# Patient Record
Sex: MALE | URBAN - METROPOLITAN AREA
[De-identification: names, ages, dates, MRNs, and addresses within clinical notes are randomized per-mention and may not be internally consistent; named-entity substitution may affect disease eponyms.]

---

## 2022-03-29 ENCOUNTER — APPOINTMENT (RX ONLY)
Dept: URBAN - METROPOLITAN AREA CLINIC 80 | Facility: CLINIC | Age: 62
Setting detail: DERMATOLOGY
End: 2022-03-29

## 2022-03-29 DIAGNOSIS — L50.3 DERMATOGRAPHIC URTICARIA: ICD-10-CM

## 2022-03-29 DIAGNOSIS — L72.0 EPIDERMAL CYST: ICD-10-CM

## 2022-03-29 DIAGNOSIS — L259 CONTACT DERMATITIS AND OTHER ECZEMA, UNSPECIFIED CAUSE: ICD-10-CM

## 2022-03-29 DIAGNOSIS — L30.8 OTHER SPECIFIED DERMATITIS: ICD-10-CM

## 2022-03-29 PROBLEM — L23.9 ALLERGIC CONTACT DERMATITIS, UNSPECIFIED CAUSE: Status: ACTIVE | Noted: 2022-03-29

## 2022-03-29 PROCEDURE — ? MEDICATION COUNSELING

## 2022-03-29 PROCEDURE — ? FULL BODY SKIN EXAM - DECLINED

## 2022-03-29 PROCEDURE — ? COUNSELING

## 2022-03-29 PROCEDURE — ? PRESCRIPTION

## 2022-03-29 PROCEDURE — ? ADDITIONAL NOTES

## 2022-03-29 PROCEDURE — 99203 OFFICE O/P NEW LOW 30 MIN: CPT

## 2022-03-29 RX ORDER — CETIRIZINE HCL 1 MG/ML
SOLUTION, ORAL ORAL QD
Qty: 60 | Refills: 3 | Status: ERX | COMMUNITY
Start: 2022-03-29

## 2022-03-29 RX ORDER — HYDROCORTISONE 25 MG/G
CREAM TOPICAL
Qty: 30 | Refills: 3 | Status: ERX | COMMUNITY
Start: 2022-03-29

## 2022-03-29 RX ORDER — FAMOTIDINE 20 MG/1
TABLET, FILM COATED ORAL
Qty: 60 | Refills: 3 | Status: ERX | COMMUNITY
Start: 2022-03-29

## 2022-03-29 RX ADMIN — HYDROCORTISONE: 25 CREAM TOPICAL at 00:00

## 2022-03-29 RX ADMIN — Medication: at 00:00

## 2022-03-29 RX ADMIN — FAMOTIDINE: 20 TABLET, FILM COATED ORAL at 00:00

## 2022-03-29 ASSESSMENT — LOCATION ZONE DERM
LOCATION ZONE: FACE
LOCATION ZONE: EYELID

## 2022-03-29 ASSESSMENT — LOCATION DETAILED DESCRIPTION DERM
LOCATION DETAILED: RIGHT SUPERIOR MEDIAL MALAR CHEEK
LOCATION DETAILED: RIGHT MEDIAL INFERIOR EYELID

## 2022-03-29 ASSESSMENT — LOCATION SIMPLE DESCRIPTION DERM
LOCATION SIMPLE: RIGHT INFERIOR EYELID
LOCATION SIMPLE: RIGHT CHEEK

## 2022-03-29 NOTE — PROCEDURE: MIPS QUALITY
Quality 47: Advance Care Plan: Advance Care Planning discussed and documented; advance care plan or surrogate decision maker documented in the medical record.
Quality 226: Preventive Care And Screening: Tobacco Use: Screening And Cessation Intervention: Patient screened for tobacco use and is an ex/non-smoker
Detail Level: Simple
Quality 130: Documentation Of Current Medications In The Medical Record: Current Medications Documented
Quality 431: Preventive Care And Screening: Unhealthy Alcohol Use - Screening: Patient not identified as an unhealthy alcohol user when screened for unhealthy alcohol use using a systematic screening method

## 2022-03-29 NOTE — PROCEDURE: MEDICATION COUNSELING
PRIMARY CARE PROVIDER: Carl Vu MD  REFERRING PROVIDER: No ref. provider found    Chief Complaint   Patient presents with   • Follow-up       Subjective   History of Present Illness:  George Wilder is a  63 y.o. male  who presents for follow up s/p septoplasty, resection of left sammy bullosa, and bilateral maxillary antrostomy and uncinectomy by Dr. Garibay on 8/10/2020. The patient has had a relatively normal postoperative course and currently has no related complaints.      Review of Systems:  Review of Systems   Constitutional: Negative for chills and fever.   HENT: Negative for congestion, ear discharge, ear pain, rhinorrhea, sinus pressure, sinus pain and sore throat.    Respiratory: Negative for cough and shortness of breath.    Cardiovascular: Negative for chest pain.   Gastrointestinal: Negative for diarrhea, nausea and vomiting.   Musculoskeletal: Positive for arthralgias.   Allergic/Immunologic: Positive for environmental allergies.       Past History:  Past Medical History:   Diagnosis Date   • Allergic rhinitis due to allergen 8/8/2017   • Arthritis    • Back pain    • CHF (congestive heart failure) (CMS/HCC)    • Chronic ethmoidal sinusitis 5/18/2017   • Chronic maxillary sinusitis 5/18/2017   • Coronary artery disease 1984    stent LAD, Cx, RCA    • GERD (gastroesophageal reflux disease)    • Heart attack (CMS/HCC) 1994    Dr Stiles    • Heartburn    • Hyperlipidemia    • Hypertension    • Myocardial infarction (CMS/HCC)    • Pain     back, & Neck      Past Surgical History:   Procedure Laterality Date   • ANKLE SURGERY     • BACK SURGERY     • CARDIAC CATHETERIZATION  1984, 1994   • CHOLECYSTECTOMY  2014    Lararocopic    • ENDOSCOPIC FUNCTIONAL SINUS SURGERY (FESS) N/A 6/12/2017    Procedure: ENDOSCOPIC FUNCTIONAL SINUS SURGERY WITH LEFT MIDDLE MEATAL ANTROSTOMY AND LEFT ETHMOIDECTOMY;  Surgeon: Jona Garibay MD;  Location: [facility] OR;  Service:    • ENDOSCOPIC FUNCTIONAL SINUS  SURGERY (FESS) Bilateral 8/10/2020    Procedure: Septoplasty Resection of left sammy bullosa deformity Bilateral maxillary antrostomy and uncinectomy;  Surgeon: Jona Garibay MD;  Location: [facility] OR;  Service: ENT;  Laterality: Bilateral;   • ETHMOIDECTOMY Left 2017    Procedure: ETHMOIDECTOMY ENDOSCOPIC;  Surgeon: Jona Garibay MD;  Location: [facility] OR;  Service:    • EYE SURGERY      LASIK   • HAND SURGERY     • SINUS SURGERY     • TOTAL HIP ARTHROPLASTY Bilateral      Family History   Problem Relation Age of Onset   • No Known Problems Mother    • Cancer Father    • Lung cancer Father         metastatic adenocarcinoma of lung   • No Known Problems Brother    • Cancer Brother      Social History     Tobacco Use   • Smoking status: Former Smoker     Packs/day: 2.00     Years: 24.00     Pack years: 48.00     Types: Cigarettes     Quit date:      Years since quittin.8   • Smokeless tobacco: Never Used   Substance Use Topics   • Alcohol use: No   • Drug use: No     Allergies:  Patient has no known allergies.    Current Outpatient Medications:   •  aspirin 81 MG EC tablet, Take 81 mg by mouth Daily., Disp: , Rfl:   •  azelastine (ASTELIN) 0.1 % nasal spray, INHALE 2 SPRAYS INTO EACH NOSTRIL TWICE A DAY (Patient taking differently: Using PRN), Disp: 1 each, Rfl: 5  •  celecoxib (CeleBREX) 200 MG capsule, Take 200 mg by mouth Daily., Disp: , Rfl:   •  Cholecalciferol (VITAMIN D3) 5000 UNITS capsule capsule, Take 5,000 Units by mouth Daily., Disp: , Rfl:   •  fluticasone (FLONASE) 50 MCG/ACT nasal spray, SPRAY 2 SPRAYS INTO EACH NOSTRIL EVERY DAY, Disp: 16 mL, Rfl: 11  •  gabapentin (NEURONTIN) 300 MG capsule, Take 300 mg by mouth 3 (Three) Times a Day., Disp: , Rfl:   •  HYDROcodone-acetaminophen (NORCO)  MG per tablet, Take 1 tablet by mouth 2 (Two) Times a Day. BACK PAIN, Disp: , Rfl:   •  metoprolol tartrate (LOPRESSOR) 50 MG tablet, TAKE 1 TABLET BY MOUTH EVERYDAY AT BEDTIME, Disp:  "30 tablet, Rfl: 1  •  Multiple Vitamins-Minerals (MULTIVITAMIN WITH MINERALS) tablet tablet, Take 1 tablet by mouth Daily., Disp: , Rfl:   •  omeprazole (priLOSEC) 20 MG capsule, Take 20 mg by mouth Daily., Disp: , Rfl:   •  simvastatin (ZOCOR) 40 MG tablet, Take 40 mg by mouth every night at bedtime., Disp: , Rfl:   •  tiZANidine (ZANAFLEX) 4 MG tablet, Take 4 mg by mouth Every 6 (Six) Hours As Needed for Muscle Spasms., Disp: , Rfl: 2  •  vitamin C (ASCORBIC ACID) 500 MG tablet, Take 500 mg by mouth Daily., Disp: , Rfl:       Objective     Vital Signs:    /66   Pulse 65   Temp 97.6 °F (36.4 °C)   Ht 188 cm (74\")   Wt (!) 143 kg (315 lb)   BMI 40.44 kg/m²     Physical Exam:  Physical Exam  CONSTITUTIONAL: well nourished, well-developed, alert, oriented, in no acute distress   COMMUNICATION AND VOICE: able to communicate normally, normal voice quality  HEAD: normocephalic, no lesions, atraumatic, no tenderness, no masses   FACE: appearance normal, no lesions, no tenderness, no deformities, facial motion symmetric  SALIVARY GLANDS: parotid glands with no tenderness, no swelling, no masses, submandibular glands with normal size, nontender  EYES: ocular motility normal, eyelids normal, orbits normal, no proptosis, conjunctiva normal , pupils equal, round   EARS:  Hearing: response to conversational voice normal bilaterally   External Ears: auricles without lesions  NOSE:  External Nose: structure normal, no tenderness on palpation, no nasal discharge, no lesions, no evidence of trauma, nostrils patent   Intranasal Exam: See scope note  ORAL:  Lips: upper and lower lips without lesion   NECK: neck appearance normal, no masses or tenderness  LYMPH NODES: no lymphadenopathy  CHEST/RESPIRATORY: respiratory effort normal, normal breath sounds   CARDIOVASCULAR: rate and rhythm normal, extremities without cyanosis or edema    NEUROLOGIC/PSYCHIATRIC: oriented to time, place and person, mood normal, affect " appropriate, CN II-XII intact grossly      PROCEDURE:      Nasal Endoscopy  ANESTHESIA:  Topical Ponticaine and Afrin Spray   REASON FOR THE PROCEDURE:  Patient is status post recent nasal surgery with a need for evaluation of the surgical site and possible nasal debridement. The patient consented to operative intervention after a full discussion of risks, benefits, and alternatives. No guarantees were made or implied.   DETAILS OF THE OPERATION:  The patient was seated in the exam room chair. Nasal endoscopy with debridement was performed with the 0 degree scope through the nares after applying nebulized ponticaine/ Afrin spray . A 0 degree endoscope was introduced into the nasal cavity and gently directed to the deeper nasal cavity examining the following structures.   FINDINGS:  Mucosal Surfaces:  The mucosal surfaces demonstrated post surgical changes.   Nasal Septum:  The nasal septum was found to be relatively midline without perforation or hematoma  Inferior Turbinates:  The inferior turbinates were found to be intact  Middle Turbinates:  The right middle turbinate is intact and the left middle turbinate has been resected  Middle Meatus:  The middle meatus was found to have postop debris on the left  Bilateral debridement was performed using the endoscope with suctioning and forceps.      Assessment   Assessment:  1. Chronic maxillary sinusitis    2. Other acute recurrent sinusitis    3. Lilian bullosa    4. Nasal septal deviation    5. Allergic rhinitis due to other allergic trigger, unspecified seasonality    6. S/P sinus surgery        Plan   Plan:     Continue nasal sprays as previously prescribed.    Call for any problems worsening symptoms.      Follow-up in 3 months    No orders of the defined types were placed in this encounter.    There are no Patient Instructions on file for this visit.  Return in about 3 months (around 1/16/2021) for Recheck.    My findings and recommendations were discussed and  questions were answered.     Ira Coyle, APRN     [place] Counseling: I discussed with the patient the risks of ixekizumab including but not limited to immunosuppression, serious infections, worsening of inflammatory bowel disease and drug reactions.  The patient understands that monitoring is required including a PPD at baseline and must alert us or the primary physician if symptoms of infection or other concerning signs are noted.

## 2022-03-29 NOTE — PROCEDURE: ADDITIONAL NOTES
Render Risk Assessment In Note?: no
Detail Level: Simple
Additional Notes: Referred to Dr.Ryan Carpenter

## 2022-03-29 NOTE — PROCEDURE: MEDICATION COUNSELING
Xeleliudz Pregnancy And Lactation Text: This medication is Pregnancy Category D and is not considered safe during pregnancy.  The risk during breast feeding is also uncertain.